# Patient Record
Sex: FEMALE | Race: WHITE | Employment: UNEMPLOYED
[De-identification: names, ages, dates, MRNs, and addresses within clinical notes are randomized per-mention and may not be internally consistent; named-entity substitution may affect disease eponyms.]

---

## 2017-10-08 ENCOUNTER — HEALTH MAINTENANCE LETTER (OUTPATIENT)
Age: 32
End: 2017-10-08

## 2018-06-18 ENCOUNTER — OFFICE VISIT (OUTPATIENT)
Dept: DERMATOLOGY | Facility: CLINIC | Age: 33
End: 2018-06-18
Payer: COMMERCIAL

## 2018-06-18 DIAGNOSIS — L50.8 AUTOIMMUNE URTICARIA: Primary | ICD-10-CM

## 2018-06-18 RX ORDER — CLINDAMYCIN PHOSPHATE 10 UG/ML
LOTION TOPICAL
COMMUNITY
Start: 2018-05-13

## 2018-06-18 RX ORDER — TRETINOIN 0.5 MG/G
CREAM TOPICAL
COMMUNITY
Start: 2018-02-18

## 2018-06-18 RX ORDER — ACETIC ACID 20.65 MG/ML
SOLUTION AURICULAR (OTIC)
COMMUNITY
Start: 2018-04-28

## 2018-06-18 RX ORDER — TRETINOIN 0.25 MG/G
CREAM TOPICAL
COMMUNITY
Start: 2017-12-18

## 2018-06-18 RX ORDER — LIFITEGRAST 50 MG/ML
SOLUTION/ DROPS OPHTHALMIC
COMMUNITY
Start: 2018-02-20

## 2018-06-18 RX ORDER — LEVOTHYROXINE SODIUM 112 UG/1
TABLET ORAL
COMMUNITY
Start: 2018-05-13

## 2018-06-18 RX ORDER — SODIUM FLUORIDE 6 MG/ML
PASTE, DENTIFRICE DENTAL
COMMUNITY
Start: 2018-04-07

## 2018-06-18 RX ORDER — DIPHENHYDRAMINE HCL 25 MG
CAPSULE ORAL
COMMUNITY

## 2018-06-18 RX ORDER — PREDNISONE 10 MG/1
TABLET ORAL
COMMUNITY
Start: 2018-06-04

## 2018-06-18 RX ORDER — FEXOFENADINE HCL 180 MG/1
TABLET ORAL
COMMUNITY

## 2018-06-18 RX ORDER — CHOLECALCIFEROL (VITAMIN D3) 10(400)/ML
DROPS ORAL
COMMUNITY

## 2018-06-18 RX ORDER — IBUPROFEN 200 MG
TABLET ORAL
COMMUNITY

## 2018-06-18 RX ORDER — AZITHROMYCIN 250 MG/1
TABLET, FILM COATED ORAL
COMMUNITY
Start: 2018-03-02

## 2018-06-18 ASSESSMENT — PAIN SCALES - GENERAL: PAINLEVEL: NO PAIN (0)
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
## 2018-06-18 NOTE — MR AVS SNAPSHOT
After Visit Summary   6/18/2018    Amarilys Pandya    MRN: [id number]           Patient Information     Date Of Birth          1985        Visit Information        Provider Department      6/18/2018 4:00 PM Emery Quinn MD [facility]        Today's Diagnoses     Autoimmune urticaria    -  1      Care Instructions    Impression/Plan:     ==> DDx Urticaria vasculitis (autoimmune with Thyroiditis) vs Urticaria (eczematous?)     Blood tests:  06/18/2018: total IgE, anti-IgE antibodies, TATE, TARAS, CBC diff, ESR     Patient should come back in the next few days for biopsy of a fresh lesion on the upper arm  Allergy specialist is asking for pre-authorisation of Xolair     Follow-up as soon patient has more lesions         If you have issues or need to come in sooner call [phone] and ask for Beulah Aguillon LPN            Follow-ups after your visit        Your next 10 appointments already scheduled     Jun 22, 2018  9:30 AM CDT   (Arrive by 9:15 AM)   Return Visit with Emery Quinn MD   [facility] ([facility])    [address] [place] [id number] [phone]              Future tests that were ordered for you today     Open Future Orders        Priority Expected Expires Ordered    Anti IgE Antibody Routine  6/19/2019 6/18/2018    IgE Routine  6/18/2019 6/18/2018    CBC with platelets differential Routine  6/18/2019 6/18/2018    Anti Nuclear [place] IgG by IFA with Reflex Routine  6/19/2019 6/18/2018    Erythrocyte sedimentation rate auto Routine  6/18/2019 6/18/2018            Who to contact     Please call your clinic at [phone] to:    Ask questions about your health    Make or cancel appointments    Discuss your medicines    Learn about your test results    Speak to your doctor            Additional Information About Your Visit        [employer] Information     [email or web address] gives you secure access to your electronic  health record. If you see a primary care provider, you can also send messages to your care team and make appointments. If you have questions, please call your primary care clinic.  If you do not have a primary care provider, please call [phone] and they will assist you.      [employer] is an electronic gateway that provides easy, online access to your medical records. With [employer], you can request a clinic appointment, read your test results, renew a prescription or communicate with your care team.     To access your existing account, please contact your [facility] or call [phone] for assistance.        Care EveryWhere ID     This is your Care EveryWhere ID. This could be used by other organizations to access your [facility] medical records  [id number]         Blood Pressure from Last 3 Encounters:   08/12/16 113/68   12/16/14 97/63   10/10/14 108/64    Weight from Last 3 Encounters:   08/12/16 89.4 kg (197 lb)   12/16/14 78 kg (172 lb)   10/10/14 73 kg (161 lb)               Primary Care Provider Office Phone # Fax #    Shanell Mawuena Agbeh, [phone]       [address] [place] [address]        Equal Access to Services     YASMINE JACKSON : Hadii aad ku hadasho Soomaali, waaxda luqadaha, qaybta kaalmada adeegyada, waxay idiin hayaan adeeg kharaangelo la'jared davis. So [facility] [phone].    ATENCIÓN: Si habla español, tiene a menard disposición servicios gratuitos de asistencia lingüística. Llame al [phone].    We comply with applicable federal civil rights laws and [place] laws. We do not discriminate on the basis of race, color, national origin, age, disability, sex, sexual orientation, or gender identity.            Thank you!     Thank you for choosing [facility]  for your care. Our goal is always to provide you with excellent care. Hearing back from our patients is one way we can continue to improve our services. Please take a few minutes to  complete the written survey that you may receive in the mail after your visit with us. Thank you!             Your Updated Medication List - Protect others around you: Learn how to safely use, store and throw away your medicines at www.disposemymeds.org.          This list is accurate as of 6/18/18  4:30 PM.  Always use your most recent med list.                   Brand Name Dispense Instructions for use Diagnosis    acetic acid 2 % otic solution    VOSOL          ADVIL 200 MG tablet   Generic drug:  ibuprofen           ALLEGRA ALLERGY 180 MG tablet   Generic drug:  fexofenadine           azithromycin 250 MG tablet    ZITHROMAX          BENADRYL 25 MG capsule   Generic drug:  diphenhydrAMINE           clindamycin 1 % lotion    CLEOCIN T          Digestive Enzyme Caps           levothyroxine 112 MCG tablet    SYNTHROID/LEVOTHROID          melatonin 1 MG Tabs tablet           MULTIPLE VITAMINS-IRON PO           predniSONE 10 MG tablet    DELTASONE          PRENATAL MULTIVITAMIN/IRON PO      Take  by mouth.        PREVIDENT 5000 BOOSTER PLUS 1.1 % Pste   Generic drug:  Sodium Fluoride           PROBIOTIC ADVANCED PO           SUPER OMEGA 3 EPA/DHA PO           * tretinoin 0.025 % cream    RETIN-A          * tretinoin 0.05 % cream    RETIN-A          Vitamin D-3 5000 units Tabs           XIIDRA 5 % Soln opthalmic solution   Generic drug:  Lifitegrast           * Notice:  This list has 2 medication(s) that are the same as other medications prescribed for you. Read the directions carefully, and ask your doctor or other care provider to review them with you.

## 2018-06-18 NOTE — NURSING NOTE
Dermatology Rooming Note    Amarilys Pandya's goals for this visit include:   Chief Complaint   Patient presents with     Derm Problem     Amarilys is here to talk about hives. She sent in pictures. She staets that they happen off and on for about 6 weeks.      Beulah Aguillon LPN
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
## 2018-06-18 NOTE — PATIENT INSTRUCTIONS
Impression/Plan:     ==> DDx Urticaria vasculitis (autoimmune with Thyroiditis) vs Urticaria (eczematous?)     Blood tests:  06/18/2018: total IgE, anti-IgE antibodies, TATE, TARAS, CBC diff, ESR     Patient should come back in the next few days for biopsy of a fresh lesion on the upper arm  Allergy specialist is asking for pre-authorisation of Xolair     Follow-up as soon patient has more lesions         If you have issues or need to come in sooner call [phone] and ask for Beulah Aguillon LPN

## 2018-06-22 ENCOUNTER — OFFICE VISIT (OUTPATIENT)
Dept: DERMATOLOGY | Facility: CLINIC | Age: 33
End: 2018-06-22
Payer: COMMERCIAL

## 2018-06-22 DIAGNOSIS — L50.8 AUTOIMMUNE URTICARIA: Primary | ICD-10-CM

## 2018-06-22 DIAGNOSIS — L50.8 AUTOIMMUNE URTICARIA: ICD-10-CM

## 2018-06-22 LAB
BASOPHILS # BLD AUTO: 0 10E9/L (ref 0–0.2)
BASOPHILS NFR BLD AUTO: 0.4 %
DIFFERENTIAL METHOD BLD: NORMAL
EOSINOPHIL # BLD AUTO: 0.2 10E9/L (ref 0–0.7)
EOSINOPHIL NFR BLD AUTO: 4.3 %
ERYTHROCYTE [DISTWIDTH] IN BLOOD BY AUTOMATED COUNT: 13.7 % (ref 10–15)
ERYTHROCYTE [SEDIMENTATION RATE] IN BLOOD BY WESTERGREN METHOD: 11 MM/H (ref 0–20)
HCT VFR BLD AUTO: 40.2 % (ref 35–47)
HGB BLD-MCNC: 13.3 G/DL (ref 11.7–15.7)
IMM GRANULOCYTES # BLD: 0 10E9/L (ref 0–0.4)
IMM GRANULOCYTES NFR BLD: 0.2 %
LYMPHOCYTES # BLD AUTO: 1.7 10E9/L (ref 0.8–5.3)
LYMPHOCYTES NFR BLD AUTO: 32.2 %
MCH RBC QN AUTO: 30.3 PG (ref 26.5–33)
MCHC RBC AUTO-ENTMCNC: 33.1 G/DL (ref 31.5–36.5)
MCV RBC AUTO: 92 FL (ref 78–100)
MONOCYTES # BLD AUTO: 0.4 10E9/L (ref 0–1.3)
MONOCYTES NFR BLD AUTO: 7.3 %
NEUTROPHILS # BLD AUTO: 3 10E9/L (ref 1.6–8.3)
NEUTROPHILS NFR BLD AUTO: 55.6 %
NRBC # BLD AUTO: 0 10*3/UL
NRBC BLD AUTO-RTO: 0 /100
PLATELET # BLD AUTO: 290 10E9/L (ref 150–450)
RBC # BLD AUTO: 4.39 10E12/L (ref 3.8–5.2)
WBC # BLD AUTO: 5.3 10E9/L (ref 4–11)

## 2018-06-22 ASSESSMENT — PAIN SCALES - GENERAL: PAINLEVEL: NO PAIN (0)
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
## 2018-06-22 NOTE — NURSING NOTE
Dermatology Rooming Note    Amarilys Pandya's goals for this visit include:   Chief Complaint   Patient presents with     Derm Problem     Amarilys is here today for a rash follow up- notes the rash is still there.      Coco Yanez MA

## 2018-06-22 NOTE — MR AVS SNAPSHOT
After Visit Summary   6/22/2018    Amarilys Pandya    MRN: [id number]           Patient Information     Date Of Birth          1985        Visit Information        Provider Department      6/22/2018 9:30 AM Emery Quinn MD [facility]        Today's Diagnoses     Autoimmune urticaria    -  1       Follow-ups after your visit        Your next 10 appointments already scheduled     Jun 22, 2018 10:45 AM CDT   LAB with [facility] ([facility])    [address] [place] [address] [phone]           Please do not eat 10-12 hours before your appointment if you are coming in fasting for labs on lipids, cholesterol, or glucose (sugar). This does not apply to pregnant women. Water, hot tea and black coffee (with nothing added) are okay. Do not drink other fluids, diet soda or chew gum.              Who to contact     Please call your clinic at [phone] to:    Ask questions about your health    Make or cancel appointments    Discuss your medicines    Learn about your test results    Speak to your doctor            Additional Information About Your Visit        [facility] [place] Information     [employer] gives you secure access to your electronic health record. If you see a primary care provider, you can also send messages to your care team and make appointments. If you have questions, please call your primary care clinic.  If you do not have a primary care provider, please call [phone] and they will assist you.      [employer] is an electronic gateway that provides easy, online access to your medical records. With [employer], you can request a clinic appointment, read your test results, renew a prescription or communicate with your care team.     To access your existing account, please contact your [facility] or call [phone] for assistance.        Care EveryWhere ID     This is your Care EveryWhere  ID. This could be used by other organizations to access your [place] medical records  [id number]         Blood Pressure from Last 3 Encounters:   08/12/16 113/68   12/16/14 97/63   10/10/14 108/64    Weight from Last 3 Encounters:   08/12/16 89.4 kg (197 lb)   12/16/14 78 kg (172 lb)   10/10/14 73 kg (161 lb)              We Performed the Following     OMALIZUMAB INJECTION        Primary Care Provider Office Phone # Fax #    Shanell Mawuena Agbeh, [phone] [address]  [place] [address]        Equal Access to Services     [facility] : Hadii aad ku hadasho Sojose martin, waaxda luqadaha, qaybta kaalmada adeegyada, emile olivia . So [facility] [phone].    ATENCIÓN: Si habla español, tiene a menard disposición servicios gratuitos de asistencia lingüística. Llame al [phone].    We comply with applicable federal civil rights laws and [place] laws. We do not discriminate on the basis of race, color, national origin, age, disability, sex, sexual orientation, or gender identity.            Thank you!     Thank you for choosing [facility] DERMATOLOG[facility]  for your care. Our goal is always to provide you with excellent care. Hearing back from our patients is one way we can continue to improve our services. Please take a few minutes to complete the written survey that you may receive in the mail after your visit with us. Thank you!             Your Updated Medication List - Protect others around you: Learn how to safely use, store and throw away your medicines at www.disposemymeds.org.          This list is accurate as of 6/22/18 10:37 AM.  Always use your most recent med list.                   Brand Name Dispense Instructions for use Diagnosis    acetic acid 2 % otic solution    VOSOL          ADVIL 200 MG tablet   Generic drug:  ibuprofen           ALLEGRA ALLERGY 180 MG tablet   Generic drug:  fexofenadine           azithromycin 250 MG tablet    ZITHROMAX           BENADRYL 25 MG capsule   Generic drug:  diphenhydrAMINE           clindamycin 1 % lotion    CLEOCIN T          Digestive Enzyme Caps           levothyroxine 112 MCG tablet    SYNTHROID/LEVOTHROID          melatonin 1 MG Tabs tablet           MULTIPLE VITAMINS-IRON PO           predniSONE 10 MG tablet    DELTASONE          PRENATAL MULTIVITAMIN/IRON PO      Take  by mouth.        PREVIDENT 5000 BOOSTER PLUS 1.1 % Pste   Generic drug:  Sodium Fluoride           PROBIOTIC ADVANCED PO           SUPER OMEGA 3 EPA/DHA PO           * tretinoin 0.025 % cream    RETIN-A          * tretinoin 0.05 % cream    RETIN-A          Vitamin D-3 5000 units Tabs           XIIDRA 5 % Soln opthalmic solution   Generic drug:  Lifitegrast           * Notice:  This list has 2 medication(s) that are the same as other medications prescribed for you. Read the directions carefully, and ask your doctor or other care provider to review them with you.
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
## 2018-06-24 LAB — IGE SERPL-ACNC: 43 KIU/L (ref 0–114)

## 2018-06-25 LAB
ANA PAT SER IF-IMP: ABNORMAL
ANA SER QL IF: POSITIVE
ANA TITR SER IF: ABNORMAL {TITER}

## 2018-06-28 LAB — ANTI IGE ANTIBODY: NORMAL

## 2018-07-03 PROBLEM — L50.8 AUTOIMMUNE URTICARIA: Status: ACTIVE | Noted: 2018-07-03

## 2018-07-16 ENCOUNTER — MYC MEDICAL ADVICE (OUTPATIENT)
Dept: DERMATOLOGY | Facility: CLINIC | Age: 33
End: 2018-07-16

## 2018-07-18 ENCOUNTER — OFFICE VISIT (OUTPATIENT)
Dept: DERMATOLOGY | Facility: CLINIC | Age: 33
End: 2018-07-18
Payer: COMMERCIAL

## 2018-07-18 DIAGNOSIS — L50.8 AUTOIMMUNE URTICARIA: Primary | ICD-10-CM

## 2018-07-18 ASSESSMENT — PAIN SCALES - GENERAL: PAINLEVEL: NO PAIN (0)

## 2018-07-18 NOTE — MR AVS SNAPSHOT
After Visit Summary   7/18/2018    Amarilys Pandya    MRN: [id number]           Patient Information     Date Of Birth          1985        Visit Information        Provider Department      7/18/2018 8:45 AM Ayah Yates PA-C [facility] Dermatology        Today's Diagnoses     Autoimmune urticaria    -  1       Follow-ups after your visit        Follow-up notes from your care team     Return in about 4 weeks (around 8/15/2018).      Your next 10 appointments already scheduled     Aug 28, 2018  2:45 PM CDT   (Arrive by 2:30 PM)   Return Visit with Emery Quinn MD   [facility] ([facility] [place])    [address] [address] [phone]              Who to contact     Please call your clinic at [phone] to:    Ask questions about your health    Make or cancel appointments    Discuss your medicines    Learn about your test results    Speak to your doctor            Additional Information About Your Visit        [employer] Information     [employer] gives you secure access to your electronic health record. If you see a primary care provider, you can also send messages to your care team and make appointments. If you have questions, please call your primary care clinic.  If you do not have a primary care provider, please call [phone] and they will assist you.      [employer] is an electronic gateway that provides easy, online access to your medical records. With [employer], you can request a clinic appointment, read your test results, renew a prescription or communicate with your care team.     To access your existing account, please contact your [facility] or call [phone] for assistance.        Care EveryWhere ID     This is your Care EveryWhere ID. This could be used by other organizations to access your [place] medical records  [id number]         Blood Pressure from Last 3 Encounters:    08/12/16 113/68   12/16/14 97/63   10/10/14 108/64    Weight from Last 3 Encounters:   08/12/16 89.4 kg (197 lb)   12/16/14 78 kg (172 lb)   10/10/14 73 kg (161 lb)              Today, you had the following     No orders found for display       Primary Care Provider Office Phone # Fax #    Shanell Mawuena Agbeh, [phone]       [address] [place] [address]        Equal Access to Services     [facility]: Hadii aad ku hadasho Soomaali, waaxda luqadaha, qaybta kaalmada adeegyada, emile olivia . So [facility] [phone].    ATENCIÓN: Si habla español, tiene a menard disposición servicios gratuitos de asistencia lingüística. [facility] [phone].    We comply with applicable federal civil rights laws and [place] laws. We do not discriminate on the basis of race, color, national origin, age, disability, sex, sexual orientation, or gender identity.            Thank you!     Thank you for choosing [facility] DERMATOLOGY  for your care. Our goal is always to provide you with excellent care. Hearing back from our patients is one way we can continue to improve our services. Please take a few minutes to complete the written survey that you may receive in the mail after your visit with us. Thank you!             Your Updated Medication List - Protect others around you: Learn how to safely use, store and throw away your medicines at www.disposemymeds.org.          This list is accurate as of 7/18/18  1:52 PM.  Always use your most recent med list.                   Brand Name Dispense Instructions for use Diagnosis    acetic acid 2 % otic solution    VOSOL          ADVIL 200 MG tablet   Generic drug:  ibuprofen           ALLEGRA ALLERGY 180 MG tablet   Generic drug:  fexofenadine           azithromycin 250 MG tablet    ZITHROMAX          BENADRYL 25 MG capsule   Generic drug:  diphenhydrAMINE           clindamycin 1 % lotion    CLEOCIN T          Digestive Enzyme Caps            levothyroxine 112 MCG tablet    SYNTHROID/LEVOTHROID          melatonin 1 MG Tabs tablet           MULTIPLE VITAMINS-IRON PO           predniSONE 10 MG tablet    DELTASONE          PRENATAL MULTIVITAMIN/IRON PO      Take  by mouth.        PREVIDENT 5000 BOOSTER PLUS 1.1 % Pste   Generic drug:  Sodium Fluoride           PROBIOTIC ADVANCED PO           SUPER OMEGA 3 EPA/DHA PO           * tretinoin 0.025 % cream    RETIN-A          * tretinoin 0.05 % cream    RETIN-A          Vitamin D-3 5000 units Tabs           XIIDRA 5 % Soln opthalmic solution   Generic drug:  Lifitegrast           * Notice:  This list has 2 medication(s) that are the same as other medications prescribed for you. Read the directions carefully, and ask your doctor or other care provider to review them with you.

## 2018-07-18 NOTE — NURSING NOTE
Dermatology Rooming Note    Amarilys Pandya's goals for this visit include:   Chief Complaint   Patient presents with     Derm Problem     Amarilys is here today for a rash follow up- notes that most of it has gone away.      Coco Yanez MA
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
## 2019-01-10 ENCOUNTER — OFFICE VISIT (OUTPATIENT)
Dept: RHEUMATOLOGY | Facility: CLINIC | Age: 34
End: 2019-01-10
Payer: COMMERCIAL

## 2019-01-10 VITALS
BODY MASS INDEX: 32.79 KG/M2 | WEIGHT: 178.2 LBS | HEIGHT: 62 IN | SYSTOLIC BLOOD PRESSURE: 105 MMHG | DIASTOLIC BLOOD PRESSURE: 71 MMHG | HEART RATE: 62 BPM | OXYGEN SATURATION: 99 %

## 2019-01-10 DIAGNOSIS — M35.01 SJOGREN'S SYNDROME WITH KERATOCONJUNCTIVITIS SICCA (H): ICD-10-CM

## 2019-01-10 DIAGNOSIS — R76.8 POSITIVE ANA (ANTINUCLEAR ANTIBODY): Primary | ICD-10-CM

## 2019-01-10 DIAGNOSIS — R21 RASH: ICD-10-CM

## 2019-01-10 LAB
ALBUMIN SERPL-MCNC: 3.9 G/DL (ref 3.4–5)
ALBUMIN UR-MCNC: NEGATIVE MG/DL
ALP SERPL-CCNC: 68 U/L (ref 40–150)
ALT SERPL W P-5'-P-CCNC: 20 U/L (ref 0–50)
ANION GAP SERPL CALCULATED.3IONS-SCNC: 4 MMOL/L (ref 3–14)
APPEARANCE UR: CLEAR
AST SERPL W P-5'-P-CCNC: 14 U/L (ref 0–45)
BASOPHILS # BLD AUTO: 0 10E9/L (ref 0–0.2)
BASOPHILS NFR BLD AUTO: 0.4 %
BILIRUB SERPL-MCNC: 0.4 MG/DL (ref 0.2–1.3)
BILIRUB UR QL STRIP: NEGATIVE
BUN SERPL-MCNC: 10 MG/DL (ref 7–30)
C3 SERPL-MCNC: 104 MG/DL (ref 76–169)
C4 SERPL-MCNC: 21 MG/DL (ref 15–50)
CALCIUM SERPL-MCNC: 8.8 MG/DL (ref 8.5–10.1)
CHLORIDE SERPL-SCNC: 106 MMOL/L (ref 94–109)
CK SERPL-CCNC: 75 U/L (ref 30–225)
CO2 SERPL-SCNC: 28 MMOL/L (ref 20–32)
COLOR UR AUTO: YELLOW
CREAT SERPL-MCNC: 0.6 MG/DL (ref 0.52–1.04)
CREAT UR-MCNC: 191 MG/DL
CRP SERPL-MCNC: <2.9 MG/L (ref 0–8)
DIFFERENTIAL METHOD BLD: NORMAL
EOSINOPHIL # BLD AUTO: 0.1 10E9/L (ref 0–0.7)
EOSINOPHIL NFR BLD AUTO: 2.8 %
ERYTHROCYTE [DISTWIDTH] IN BLOOD BY AUTOMATED COUNT: 13.5 % (ref 10–15)
ERYTHROCYTE [SEDIMENTATION RATE] IN BLOOD BY WESTERGREN METHOD: 9 MM/H (ref 0–20)
GFR SERPL CREATININE-BSD FRML MDRD: >90 ML/MIN/{1.73_M2}
GLUCOSE SERPL-MCNC: 94 MG/DL (ref 70–99)
GLUCOSE UR STRIP-MCNC: NEGATIVE MG/DL
HCT VFR BLD AUTO: 40.6 % (ref 35–47)
HGB BLD-MCNC: 13.8 G/DL (ref 11.7–15.7)
HGB UR QL STRIP: ABNORMAL
KETONES UR STRIP-MCNC: NEGATIVE MG/DL
LEUKOCYTE ESTERASE UR QL STRIP: NEGATIVE
LYMPHOCYTES # BLD AUTO: 1.5 10E9/L (ref 0.8–5.3)
LYMPHOCYTES NFR BLD AUTO: 31.9 %
MCH RBC QN AUTO: 29.9 PG (ref 26.5–33)
MCHC RBC AUTO-ENTMCNC: 34 G/DL (ref 31.5–36.5)
MCV RBC AUTO: 88 FL (ref 78–100)
MONOCYTES # BLD AUTO: 0.4 10E9/L (ref 0–1.3)
MONOCYTES NFR BLD AUTO: 7.9 %
NEUTROPHILS # BLD AUTO: 2.6 10E9/L (ref 1.6–8.3)
NEUTROPHILS NFR BLD AUTO: 57 %
NITRATE UR QL: NEGATIVE
NON-SQ EPI CELLS #/AREA URNS LPF: NORMAL /LPF
PH UR STRIP: 7.5 PH (ref 5–7)
PLATELET # BLD AUTO: 287 10E9/L (ref 150–450)
POTASSIUM SERPL-SCNC: 3.9 MMOL/L (ref 3.4–5.3)
PROT SERPL-MCNC: 7.5 G/DL (ref 6.8–8.8)
PROT UR-MCNC: 0.1 G/L
PROT/CREAT 24H UR: 0.05 G/G CR (ref 0–0.2)
RBC # BLD AUTO: 4.61 10E12/L (ref 3.8–5.2)
RBC #/AREA URNS AUTO: NORMAL /HPF
SODIUM SERPL-SCNC: 138 MMOL/L (ref 133–144)
SOURCE: ABNORMAL
SP GR UR STRIP: 1.01 (ref 1–1.03)
TSH SERPL DL<=0.005 MIU/L-ACNC: 0.42 MU/L (ref 0.4–4)
UROBILINOGEN UR STRIP-ACNC: 0.2 EU/DL (ref 0.2–1)
WBC # BLD AUTO: 4.6 10E9/L (ref 4–11)
WBC #/AREA URNS AUTO: NORMAL /HPF

## 2019-01-10 PROCEDURE — 00000401 ZZHCL STATISTIC THROMBIN TIME NC: Performed by: INTERNAL MEDICINE

## 2019-01-10 PROCEDURE — 84156 ASSAY OF PROTEIN URINE: CPT | Performed by: INTERNAL MEDICINE

## 2019-01-10 PROCEDURE — 81001 URINALYSIS AUTO W/SCOPE: CPT | Performed by: INTERNAL MEDICINE

## 2019-01-10 PROCEDURE — 36415 COLL VENOUS BLD VENIPUNCTURE: CPT | Performed by: INTERNAL MEDICINE

## 2019-01-10 PROCEDURE — 00000167 ZZHCL STATISTIC INR NC: Performed by: INTERNAL MEDICINE

## 2019-01-10 PROCEDURE — 86235 NUCLEAR ANTIGEN ANTIBODY: CPT | Performed by: INTERNAL MEDICINE

## 2019-01-10 PROCEDURE — 84443 ASSAY THYROID STIM HORMONE: CPT | Performed by: INTERNAL MEDICINE

## 2019-01-10 PROCEDURE — 82550 ASSAY OF CK (CPK): CPT | Performed by: INTERNAL MEDICINE

## 2019-01-10 PROCEDURE — 85613 RUSSELL VIPER VENOM DILUTED: CPT | Performed by: INTERNAL MEDICINE

## 2019-01-10 PROCEDURE — 80053 COMPREHEN METABOLIC PANEL: CPT | Performed by: INTERNAL MEDICINE

## 2019-01-10 PROCEDURE — 86160 COMPLEMENT ANTIGEN: CPT | Performed by: INTERNAL MEDICINE

## 2019-01-10 PROCEDURE — 86147 CARDIOLIPIN ANTIBODY EA IG: CPT | Performed by: INTERNAL MEDICINE

## 2019-01-10 PROCEDURE — 85730 THROMBOPLASTIN TIME PARTIAL: CPT | Performed by: INTERNAL MEDICINE

## 2019-01-10 PROCEDURE — 86140 C-REACTIVE PROTEIN: CPT | Performed by: INTERNAL MEDICINE

## 2019-01-10 PROCEDURE — 86146 BETA-2 GLYCOPROTEIN ANTIBODY: CPT | Performed by: INTERNAL MEDICINE

## 2019-01-10 PROCEDURE — 99204 OFFICE O/P NEW MOD 45 MIN: CPT | Performed by: INTERNAL MEDICINE

## 2019-01-10 PROCEDURE — 86225 DNA ANTIBODY NATIVE: CPT | Performed by: INTERNAL MEDICINE

## 2019-01-10 PROCEDURE — 85652 RBC SED RATE AUTOMATED: CPT | Performed by: INTERNAL MEDICINE

## 2019-01-10 PROCEDURE — 85025 COMPLETE CBC W/AUTO DIFF WBC: CPT | Performed by: INTERNAL MEDICINE

## 2019-01-10 ASSESSMENT — MIFFLIN-ST. JEOR: SCORE: 1476.72
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
## 2019-01-10 NOTE — NURSING NOTE
RAPID3 (0-30) Cumulative Score  0          RAPID3 Weighted Score (divide #4 by 3 and that is the weighted score)  0         Ruby Hung CMA Rheumatology  1/10/2019 7:26 AM

## 2019-01-11 LAB
B2 GLYCOPROT1 IGG SERPL IA-ACNC: 0.8 U/ML
B2 GLYCOPROT1 IGM SERPL IA-ACNC: 0.9 U/ML
CARDIOLIPIN ANTIBODY IGG: <1.6 GPL-U/ML (ref 0–19.9)
CARDIOLIPIN ANTIBODY IGM: 0.2 MPL-U/ML (ref 0–19.9)
DSDNA AB SER-ACNC: 1 IU/ML
ENA RNP IGG SER IA-ACNC: <0.2 AI (ref 0–0.9)
ENA SCL70 IGG SER IA-ACNC: <0.2 AI (ref 0–0.9)
ENA SM IGG SER-ACNC: <0.2 AI (ref 0–0.9)
ENA SS-A IGG SER IA-ACNC: <0.2 AI (ref 0–0.9)
ENA SS-B IGG SER IA-ACNC: <0.2 AI (ref 0–0.9)
LA PPP-IMP: NEGATIVE

## 2019-01-15 ENCOUNTER — OFFICE VISIT (OUTPATIENT)
Dept: DERMATOLOGY | Facility: CLINIC | Age: 34
End: 2019-01-15
Payer: COMMERCIAL

## 2019-01-15 DIAGNOSIS — L50.9 URTICARIA: Primary | ICD-10-CM

## 2019-01-15 PROCEDURE — 99213 OFFICE O/P EST LOW 20 MIN: CPT | Performed by: DERMATOLOGY

## 2019-01-15 ASSESSMENT — PAIN SCALES - GENERAL: PAINLEVEL: NO PAIN (0)
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
## 2019-01-15 NOTE — PATIENT INSTRUCTIONS
Take over the counter Zyrtec. Follow the week by week plan below. Only increase the dose each week if the hives persist.     Start with 1 pill a day for a week. If hives still flare, then take 1 pill in the morning, 1 at night for a week. If still having out breaks, take 1 pill in the morning, 2 at night. You can go up to 40 mg daily, 2 in the morning, 2 at night for a week if the hives still persist.

## 2019-01-15 NOTE — NURSING NOTE
Amarilys Pandya's goals for this visit include:   Chief Complaint   Patient presents with     Rash     Mostly lower arms -        She requests these members of her care team be copied on today's visit information: NO    PCP: Agbeh, Cephas Mawuena    Referring Provider:  No referring provider defined for this encounter.    There were no vitals taken for this visit.    Do you need any medication refills at today's visit? NO    Altagracia Sanders CMA

## 2019-05-08 ENCOUNTER — TELEPHONE (OUTPATIENT)
Dept: DERMATOLOGY | Facility: CLINIC | Age: 34
End: 2019-05-08

## 2019-05-08 NOTE — TELEPHONE ENCOUNTER
Patient needs to be scheduled for drug allergy testing.      Monday 15-30 minutes  Wednesday 15 minutes  Friday 30 Minutes     All return allergy

## 2020-02-24 ENCOUNTER — HEALTH MAINTENANCE LETTER (OUTPATIENT)
Age: 35
End: 2020-02-24

## 2020-07-27 ENCOUNTER — TELEPHONE (OUTPATIENT)
Dept: ALLERGY | Facility: CLINIC | Age: 35
End: 2020-07-27

## 2020-07-27 NOTE — TELEPHONE ENCOUNTER
Patient reports she is not receiving Xolair. Per Dr. Quinn, Xolair therapy plan should be discontinued.

## 2020-08-19 ENCOUNTER — THERAPY VISIT (OUTPATIENT)
Dept: PHYSICAL THERAPY | Facility: CLINIC | Age: 35
End: 2020-08-19
Payer: OTHER GOVERNMENT

## 2020-08-19 DIAGNOSIS — N81.89 PELVIC FLOOR WEAKNESS IN FEMALE: ICD-10-CM

## 2020-08-19 DIAGNOSIS — N39.8 VOIDING DYSFUNCTION: ICD-10-CM

## 2020-08-19 DIAGNOSIS — R32 URINARY INCONTINENCE: ICD-10-CM

## 2020-08-19 PROCEDURE — 97161 PT EVAL LOW COMPLEX 20 MIN: CPT | Mod: GP | Performed by: PHYSICAL THERAPIST

## 2020-08-19 PROCEDURE — 97535 SELF CARE MNGMENT TRAINING: CPT | Mod: GP | Performed by: PHYSICAL THERAPIST

## 2020-08-19 NOTE — LETTER
[facility]  [address] [address]  [address]  [place] [address]  [phone]    2020    Re: Amarilys Pandya   :   1985  MRN:  [id number]   REFERRING PHYSICIAN:   Richar [facility]  Date of Initial Evaluation: 2020  Visits:  Rxs Used: 1  Reason for Referral:     Urinary incontinence  Voiding dysfunction  Pelvic floor weakness in female    EVALUATION SUMMARY  [place] [facility] Initial Evaluation  Subjective:  The history is provided by the patient. No  was used.   Therapist Generated HPI Evaluation  Problem details: Onset of urinary incontinence after her 1st child and has had some on and off since then. What is newer, is that she will pee, stand up and then have to pee again. Seems to have worsened after her 3rd. Kids are 7,5,4. Most of the leakage occurs with increased pressure like coughing or sneezing.     Sometimes around the time of her period feels pressure like something is going to fall out but no tissue noted..         Type of problem:  Incontinence.    This is a new condition.  Condition occurred with:  After pregnancy.    Patient reports pain:  N/a.        Symptoms are exacerbated by sneezing and coughing        Patient Health History  Pain is reported as 0/10 on pain scale.  General health as reported by patient is good.  Pertinent medical history includes: overweight, migraines/headaches, thyroid problems and concussions/dizziness.   Red flags:  None as reported by patient.   Other medical allergies details: adhesive.           Re: Amarilys Pandya   :   1985    Current medications:  Thyroid medication.    Current occupation is Dental Hygiene.   Primary job tasks include:  Computer work and prolonged sitting.                                  SUBJECTIVE:        Verbal permission from patient to do internal pelvis muscle assessment?deferred due to period per patient request Verbal permission to complete external  perineal assessment? Deferred due to period per patient request Would you like someone else in the room as a chaperone? no      Urination:  Do you leak on the way to the bathroom or with a strong urge to void? No   Do you leak with cough,sneeze, jumping, running?Yes   Any other activities that cause leaking? Yes , sex when she was on her stomach, running  Do you have triggers that make you feel you can't wait to go to the bathroom? No what are they NA.  Type of pad and number used per day? None  When you leak what is the amount? Medium  How long can you delay the need to urinate? few hours.   How many times do you get up to urinate at night? 0-1   Can you stop the flow of urine when on the toilet? Hasn't tried. Isn't sure.  Is the volume of urine passed usually: medium. (8sec rule= 250ml with average bladder storing 400-600ml)  Do you feel empty when you are done? Yes, but then stands up and feels like she still has to pee. If she sits back down, may have to strain to pee again.   Do you strain to pass urine? Only if she has the urge, then she has to pee again and will strain then.   Do you have a slow or hesitant urinary stream? No  Do you have difficulty initiating the urine stream? No  Is urination painful? No  How many bladder infections have you had in last 12 months? 0  Fluid intake(one glass is 8oz or one cup) 3-4 glasses/day, 0-1 (pop/coffee) caffinated glasses/day  0 alcohol glasses/day.          Re: Amarilys Pandya   :   1985    Bowel habits:  Frequency of bowel movements? 7 times a week  Consistancy of stool? soft, [place] Stool Scale more like diarrhea. Changed diet to more fruits and veggies and notes that is when her stool changed.   Do you ignore the urge to defecate? No  Do you strain to pass stool? No    Aggrevating factors:  Is loss of stool associated with an activity (lifting, coughing, running) or a food)  No  Are there any foods that increase or decrease your symptoms?  No  Do you have  any food allergies?  Yes, honey, milk      Sensation:   Can you tell if there is solid, liquid, or gas in the rectum?  Yes  Do you feel the urge to move your bowels?  Yes  Is the urge very strong and difficult to control? NA Or weak/absent? NA  Do you feel the rectum is empty with you finish a bowel movement?   Yes    Do you have abdominal pain?  No  Describe the quality of the pain: NA  What makes your abdominal pain worse? NA  What makes your abdominal pain better? NA  Do you ever have pain that wakes you at night? No    Do you have rectal pain, pressure, or burning?  No     Pelvic Pain:  Do you have any pelvic pain with intercourse, exams, use of tampons? Yes, if her cervix is hit, it's a sharp pain.   Is initial penetration during intercourse painful? No  Is deeper penetration painful? Yes  Do you use lubricant? No What kind?   ?  Given birth? Yes Any complications? Last one got stuck  # of vaginal delieveries?3  # of C-sections?0  # of episiotomies?2  Are you sexually active?Yes  Have you ever been worried for your physical safety? No    Do you have any depression, anxiety, panic attacks, excessive stress?  Yes  Re: Amarilys HART Ya   :   1985    Any abdominal or pelvic surgeries? Circlage for 1st pregnancy  Are you having any regular exercise? No  Have you practiced the PF(kegel) exercises for 4 or more weeks?No  Thyroid checked? Yes, Hashimoto's disease-allergic to the sun and gets a rash from the sun. (related to hair loss, flu-like symptoms, wt gain/loss, fatigue, menopause)  Changed diet lately? Eating healthier    OBSERVATION  Lumbar Posture: Negative  Pelvic symmetry: Negative    ROM  Single leg standing unilateral hip flexion PSIS:Negative  Standing forward flexion PSIS:Negative  Passive Hip ROM:Negative  YUMIKO:Negative  YUMIKO with OP:Negative    MUSCLE PERFORMANCE  Pt notes that she just got her period today and preferred to defer the external and internal PF muscle assessment to next visit.  Will test PF muscle strength and coordination at follow up visit.            Pelvic Dysfunction Evaluation:      Flexibility:    Tightness not present at:  Adductors; Hamstrings or Piriformis        Assessment/Plan:    Patient is a 35 year old female with pelvic complaints.    Patient has the following significant findings with corresponding treatment plan.                Diagnosis 1:  Urinary incontinence with voiding dysfunction likely related to weakness in pelvic floor. Will be confirmed once able to do PF assessment.   Decreased ROM/flexibility - manual therapy, therapeutic exercise, therapeutic activity and home program  Decreased strength - therapeutic exercise, therapeutic activities and home program  Impaired muscle performance - neuro re-education and home program  Decreased function - therapeutic activities and home program          Re: Amarilys Pandya   :   1985      Therapy Evaluation Codes:   1) History comprised of:   Personal factors that impact the plan of care:      Time since onset of symptoms.    Comorbidity factors that impact the plan of care are:      Migraines/headaches, Overweight and Thyroid problems.     Medications impacting care: Thyroid, BCP.  2) Examination of Body Systems comprised of:   Body structures and functions that impact the plan of care:      Pelvis.   Activity limitations that impact the plan of care are:      Stress incontinence.  3) Clinical presentation characteristics are:   Stable/Uncomplicated.  4) Decision-Making    Low complexity using standardized patient assessment instrument and/or measureable assessment of functional outcome.  Cumulative Therapy Evaluation is: Low complexity.    Previous and current functional limitations:  (See Goal Flow Sheet for this information)    Short term and Long term goals: (See Goal Flow Sheet for this information)     Communication ability:  Patient appears to be able to clearly communicate and understand verbal and written  communication and follow directions correctly.  Treatment Explanation - The following has been discussed with the patient:   RX ordered/plan of care  Anticipated outcomes  Possible risks and side effects  This patient would benefit from PT intervention to resume normal activities.   Rehab potential is good.    Frequency:  1 X week, once daily  Duration:  for 6-8 weeks  Discharge Plan:  Achieve all LTG.  Independent in home treatment program.  Reach maximal therapeutic benefit.        Thank you for your referral.    INQUIRIES  Therapist: Becca Vinson, PT, MPT  [facility] [place] [facility]  [address] [address] [address]  Phone: [phone]  Fax: [phone]

## 2020-08-19 NOTE — PROGRESS NOTES
[place] [facility] Initial Evaluation  Subjective:  The history is provided by the patient. No  was used.   Therapist Generated HPI Evaluation  Problem details: Onset of urinary incontinence after her 1st child and has had some on and off since then. What is newer, is that she will pee, stand up and then have to pee again. Seems to have worsened after her 3rd. Kids are 7,5,4. Most of the leakage occurs with increased pressure like coughing or sneezing.     Sometimes around the time of her period feels pressure like something is going to fall out but no tissue noted..         Type of problem:  Incontinence.    This is a new condition.  Condition occurred with:  After pregnancy.    Patient reports pain:  N/a.        Symptoms are exacerbated by sneezing and coughing            Patient Health History         Pain is reported as 0/10 on pain scale.  General health as reported by patient is good.  Pertinent medical history includes: overweight, migraines/headaches, thyroid problems and concussions/dizziness.   Red flags:  None as reported by patient.   Other medical allergies details: adhesive.       Current medications:  Thyroid medication.    Current occupation is Dental Hygiene.   Primary job tasks include:  Computer work and prolonged sitting.                                  SUBJECTIVE:        Verbal permission from patient to do internal pelvis muscle assessment?deferred due to period per patient request Verbal permission to complete external perineal assessment? Deferred due to period per patient request Would you like someone else in the room as a chaperone? no      Urination:  Do you leak on the way to the bathroom or with a strong urge to void? No   Do you leak with cough,sneeze, jumping, running?Yes   Any other activities that cause leaking? Yes , sex when she was on her stomach, running  Do you have triggers that make you feel you can't wait to go to the bathroom? No what are  they NA.  Type of pad and number used per day? None  When you leak what is the amount? Medium  How long can you delay the need to urinate? few hours.   How many times do you get up to urinate at night? 0-1   Can you stop the flow of urine when on the toilet? Hasn't tried. Isn't sure.  Is the volume of urine passed usually: medium. (8sec rule= 250ml with average bladder storing 400-600ml)  Do you feel empty when you are done? Yes, but then stands up and feels like she still has to pee. If she sits back down, may have to strain to pee again.   Do you strain to pass urine? Only if she has the urge, then she has to pee again and will strain then.   Do you have a slow or hesitant urinary stream? No  Do you have difficulty initiating the urine stream? No  Is urination painful? No  How many bladder infections have you had in last 12 months? 0  Fluid intake(one glass is 8oz or one cup) 3-4 glasses/day, 0-1 (pop/coffee) caffinated glasses/day  0 alcohol glasses/day.    Bowel habits:  Frequency of bowel movements? 7 times a week  Consistancy of stool? soft, [place] Stool Scale more like diarrhea. Changed diet to more fruits and veggies and notes that is when her stool changed.   Do you ignore the urge to defecate? No  Do you strain to pass stool? No    Aggrevating factors:  Is loss of stool associated with an activity (lifting, coughing, running) or a food)  No  Are there any foods that increase or decrease your symptoms?  No  Do you have any food allergies?  Yes, honey, milk      Sensation:   Can you tell if there is solid, liquid, or gas in the rectum?  Yes  Do you feel the urge to move your bowels?  Yes  Is the urge very strong and difficult to control? NA Or weak/absent? NA  Do you feel the rectum is empty with you finish a bowel movement?   Yes    Do you have abdominal pain?  No  Describe the quality of the pain: NA  What makes your abdominal pain worse? NA  What makes your abdominal pain better? NA  Do you ever have  pain that wakes you at night? No    Do you have rectal pain, pressure, or burning?  No     Pelvic Pain:  Do you have any pelvic pain with intercourse, exams, use of tampons? Yes, if her cervix is hit, it's a sharp pain.   Is initial penetration during intercourse painful? No  Is deeper penetration painful? Yes  Do you use lubricant? No What kind?   ?  Given birth? Yes Any complications? Last one got stuck  # of vaginal delieveries?3  # of C-sections?0  # of episiotomies?2  Are you sexually active?Yes  Have you ever been worried for your physical safety? No    Do you have any depression, anxiety, panic attacks, excessive stress?  Yes  Any abdominal or pelvic surgeries? Circlage for 1st pregnancy  Are you having any regular exercise? No  Have you practiced the PF(kegel) exercises for 4 or more weeks?No  Thyroid checked? Yes, Hashimoto's disease-allergic to the sun and gets a rash from the sun. (related to hair loss, flu-like symptoms, wt gain/loss, fatigue, menopause)  Changed diet lately? Eating healthier    OBSERVATION  Lumbar Posture: Negative  Pelvic symmetry: Negative    ROM  Single leg standing unilateral hip flexion PSIS:Negative  Standing forward flexion PSIS:Negative  Passive Hip ROM:Negative  YUMIKO:Negative  YUMIKO with OP:Negative    MUSCLE PERFORMANCE  Pt notes that she just got her period today and preferred to defer the external and internal PF muscle assessment to next visit. Will test PF muscle strength and coordination at follow up visit.     Objective:  System                                 Pelvic Dysfunction Evaluation:        Flexibility:      Tightness not present at:  Adductors; Hamstrings or Piriformis                                     General     ROS    Assessment/Plan:    Patient is a 35 year old female with pelvic complaints.    Patient has the following significant findings with corresponding treatment plan.                Diagnosis 1:  Urinary incontinence with voiding dysfunction likely  related to weakness in pelvic floor. Will be confirmed once able to do PF assessment.   Decreased ROM/flexibility - manual therapy, therapeutic exercise, therapeutic activity and home program  Decreased strength - therapeutic exercise, therapeutic activities and home program  Impaired muscle performance - neuro re-education and home program  Decreased function - therapeutic activities and home program    Therapy Evaluation Codes:   1) History comprised of:   Personal factors that impact the plan of care:      Time since onset of symptoms.    Comorbidity factors that impact the plan of care are:      Migraines/headaches, Overweight and Thyroid problems.     Medications impacting care: Thyroid, BCP.  2) Examination of Body Systems comprised of:   Body structures and functions that impact the plan of care:      Pelvis.   Activity limitations that impact the plan of care are:      Stress incontinence.  3) Clinical presentation characteristics are:   Stable/Uncomplicated.  4) Decision-Making    Low complexity using standardized patient assessment instrument and/or measureable assessment of functional outcome.  Cumulative Therapy Evaluation is: Low complexity.    Previous and current functional limitations:  (See Goal Flow Sheet for this information)    Short term and Long term goals: (See Goal Flow Sheet for this information)     Communication ability:  Patient appears to be able to clearly communicate and understand verbal and written communication and follow directions correctly.  Treatment Explanation - The following has been discussed with the patient:   RX ordered/plan of care  Anticipated outcomes  Possible risks and side effects  This patient would benefit from PT intervention to resume normal activities.   Rehab potential is good.    Frequency:  1 X week, once daily  Duration:  for 6-8 weeks  Discharge Plan:  Achieve all LTG.  Independent in home treatment program.  Reach maximal therapeutic benefit.    Please  refer to the daily flowsheet for treatment today, total treatment time and time spent performing 1:1 timed codes.

## 2020-09-03 ENCOUNTER — THERAPY VISIT (OUTPATIENT)
Dept: PHYSICAL THERAPY | Facility: CLINIC | Age: 35
End: 2020-09-03
Payer: OTHER GOVERNMENT

## 2020-09-03 DIAGNOSIS — N39.8 VOIDING DYSFUNCTION: ICD-10-CM

## 2020-09-03 DIAGNOSIS — N81.89 PELVIC FLOOR WEAKNESS IN FEMALE: ICD-10-CM

## 2020-09-03 DIAGNOSIS — R32 URINARY INCONTINENCE: ICD-10-CM

## 2020-09-03 PROCEDURE — 97112 NEUROMUSCULAR REEDUCATION: CPT | Mod: GP | Performed by: PHYSICAL THERAPIST

## 2020-09-10 ENCOUNTER — THERAPY VISIT (OUTPATIENT)
Dept: PHYSICAL THERAPY | Facility: CLINIC | Age: 35
End: 2020-09-10
Payer: OTHER GOVERNMENT

## 2020-09-10 DIAGNOSIS — N39.8 VOIDING DYSFUNCTION: ICD-10-CM

## 2020-09-10 DIAGNOSIS — R32 URINARY INCONTINENCE: ICD-10-CM

## 2020-09-10 DIAGNOSIS — N81.89 PELVIC FLOOR WEAKNESS IN FEMALE: ICD-10-CM

## 2020-09-10 PROCEDURE — 97112 NEUROMUSCULAR REEDUCATION: CPT | Mod: GP | Performed by: PHYSICAL THERAPIST

## 2020-09-10 PROCEDURE — 97110 THERAPEUTIC EXERCISES: CPT | Mod: GP | Performed by: PHYSICAL THERAPIST

## 2020-09-24 ENCOUNTER — THERAPY VISIT (OUTPATIENT)
Dept: PHYSICAL THERAPY | Facility: CLINIC | Age: 35
End: 2020-09-24
Payer: OTHER GOVERNMENT

## 2020-09-24 DIAGNOSIS — R32 URINARY INCONTINENCE: ICD-10-CM

## 2020-09-24 DIAGNOSIS — N81.89 PELVIC FLOOR WEAKNESS IN FEMALE: ICD-10-CM

## 2020-09-24 DIAGNOSIS — N39.8 VOIDING DYSFUNCTION: ICD-10-CM

## 2020-09-24 DIAGNOSIS — M25.571 PAIN IN JOINT, ANKLE AND FOOT, RIGHT: ICD-10-CM

## 2020-09-24 PROCEDURE — 97140 MANUAL THERAPY 1/> REGIONS: CPT | Mod: GP | Performed by: PHYSICAL THERAPIST

## 2020-09-24 PROCEDURE — 97161 PT EVAL LOW COMPLEX 20 MIN: CPT | Mod: GP | Performed by: PHYSICAL THERAPIST

## 2020-09-24 PROCEDURE — 97110 THERAPEUTIC EXERCISES: CPT | Mod: GP | Performed by: PHYSICAL THERAPIST

## 2020-09-24 NOTE — PROGRESS NOTES
"[place] [facility] Initial Evaluation  Subjective:  The history is provided by the patient. No  was used.   Patient Health History  Amarilys Pandya being seen for R foot pain.     Date of Onset: about July 2020.   Problem occurred: slipped in shower and walking barefoot   Pain is reported as 8/10 (can increase to 10/10) on pain scale.  General health as reported by patient is good.  Pertinent medical history includes: thyroid problems and migraines/headaches.   Red flags:  None as reported by patient.  Medical allergies: other (see epic).   Surgeries include:  None.    Current medications:  Thyroid medication.    Current occupation is dental hygenist.   Primary job tasks: currently not working due to covid.                  Therapist Generated HPI Evaluation  Problem details: Date of MD order for this episode was 8-11-20. Amarilys states she slipped in the shower about July and injured the R foot, she saw her family dr and then was referred to a foot specialist. She was put in a cam boot and wore it consistently until the past couple weeks when she started having hip and arch pain. It was getting so bad it was difficult to walk. She uses the boot on occasion if really sore. Has been wearing a sandal with arch support.   Amarilys states she did have some minor pain in the R foot prior to the slip in the shower when she was home due to covid and walking on her tile floors all day without shoes. .         Type of problem:  Right ankle and right foot.    This is a new condition.  Condition occurred with:  A fall/slip (going without shoes on hard rebecca).  Where condition occurred: at home.  Patient reports pain:  Other (arch of R foot and lateral lower leg).  Pain is described as aching (\"giant bruise\") and is constant (arch pain).  Pain is the same all the time.  Since onset symptoms are gradually worsening.  Associated symptoms:  Other, loss of motion/stiffness and loss of strength (feels " feet are pronating). Symptoms are exacerbated by activity (all)  and relieved by rest.  Special tests included:  MRI (torn tendon-peroneal).    Work activity restrictions: currently not working.  Barriers include:  Stairs (split level).                        Objective:    Gait:    Gait Type:  Antalgic   Assistive Devices:  None      Flexibility/Screens:       Lower Extremity:      Decreased right lower extremity flexibility:  Gastroc and Soleus          Ankle/Foot Evaluation  ROM:    AROM:    Dorsiflexion: Left:    Right:   0  Plantarflexion: Left:     Right:  60  Inversion: Left:      Right:  Wnl  Eversion:     Right:  Wnl with pain      PROM:    Dorsiflexion: Left:        Right:   3   Plantarflexion: Left:        Right:  62      Great Toe Flexion: Left:      Right:  30    Great Toe Extension: Left:        Right: 80      Strength:    Dorsiflexion:  Right: 5/5   Pain:  Plantarflexion: Right: /5 strong/painful Pain:  Inversion:Right: 4+/5  Pain:  Eversion:Right: 4-/5   Pain:-/+                  LIGAMENT TESTING: not assessed                PALPATION: Palpation of ankle: Amarilys reports peroneal pain is much less than it has been.    Right ankle tenderness present at:   gastroc/soleus; anterior tibialis and plantar fascia  Right ankle tenderness not present at:  achilles tendon  EDEMA: Edema ankle: pocket of swelling just ant to medial calcaneal area.          MOBILITY TESTING:       Talocrural Right: hypomobile      First Ray Left: normal      FUNCTIONAL TESTS: Functional test ankle: unable to test due to pain.                                                              General     ROS    Assessment/Plan:    Patient is a 35 year old female with right side ankle/foot complaints.    Patient has the following significant findings with corresponding treatment plan.                Diagnosis 1:  Ankle/foot pain R  Pain -  hot/cold therapy, manual therapy, self management, education and home program  Decreased  ROM/flexibility - manual therapy, therapeutic exercise and home program  Decreased joint mobility - manual therapy, therapeutic exercise and home program  Decreased strength - therapeutic exercise, therapeutic activities and home program  Impaired balance - neuro re-education, therapeutic activities and home program  Decreased proprioception - neuro re-education, therapeutic activities and home program  Inflammation - cold therapy, US and self management/home program  Impaired gait - gait training and home program  Impaired muscle performance - neuro re-education and home program  Decreased function - therapeutic activities and home program    Therapy Evaluation Codes:   1) History comprised of:   Personal factors that impact the plan of care:      Time since onset of symptoms.    Comorbidity factors that impact the plan of care are:      None.     Medications impacting care: None.  2) Examination of Body Systems comprised of:   Body structures and functions that impact the plan of care:      Ankle.   Activity limitations that impact the plan of care are:      Cooking, Driving, Dressing, Jumping, Lifting, Running, Squatting/kneeling, Stairs, Standing, Walking, Working and Sleeping.  3) Clinical presentation characteristics are:   Stable/Uncomplicated.  4) Decision-Making    Low complexity using standardized patient assessment instrument and/or measureable assessment of functional outcome.  Cumulative Therapy Evaluation is: Low complexity.    Previous and current functional limitations:  (See Goal Flow Sheet for this information)    Short term and Long term goals: (See Goal Flow Sheet for this information)     Communication ability:  Patient appears to be able to clearly communicate and understand verbal and written communication and follow directions correctly.  Treatment Explanation - The following has been discussed with the patient:   RX ordered/plan of care  Anticipated outcomes  Possible risks and side  effects  This patient would benefit from PT intervention to resume normal activities.   Rehab potential is good.    Frequency:  1 X week, once daily  Duration:  for 6-8 weeks  Discharge Plan:  Achieve all LTG.  Independent in home treatment program.  Reach maximal therapeutic benefit.    Please refer to the daily flowsheet for treatment today, total treatment time and time spent performing 1:1 timed codes.

## 2020-09-28 ENCOUNTER — THERAPY VISIT (OUTPATIENT)
Dept: PHYSICAL THERAPY | Facility: CLINIC | Age: 35
End: 2020-09-28
Payer: OTHER GOVERNMENT

## 2020-09-28 DIAGNOSIS — M25.571 PAIN IN JOINT, ANKLE AND FOOT, RIGHT: ICD-10-CM

## 2020-09-28 PROCEDURE — 97110 THERAPEUTIC EXERCISES: CPT | Mod: GP | Performed by: PHYSICAL THERAPIST

## 2020-09-28 PROCEDURE — 97140 MANUAL THERAPY 1/> REGIONS: CPT | Mod: GP | Performed by: PHYSICAL THERAPIST

## 2020-09-28 NOTE — LETTER
[facility]  [id number] [facility]  [address]  [place] [address]  [phone]    2020    Re: Amarilys Pandya   :   1985  MRN:  [id number]   REFERRING PHYSICIAN:   [facility]    Date of Initial Evaluation: 20  Visits:     Reason for Referral:  Pain in joint, ankle and foot, right    EVALUATION SUMMARY    Discharge Note    Progress reporting period is from initial evaluation date (please see noted date below) to Sep 28, 2020.  Linked Episodes   Type: Episode: Status: Noted: Resolved: Last update: Updated by:   PHYSICAL THERAPY ankle/foot 20 Active 2020  9:56 AM Stephanie Thomas, PT      Comments:       Amarilys missed a follow up appt due to schedule confusion. Please see information below for last relevant information on current status.  Patient seen for  2  visits.    SUBJECTIVE  Subjective changes noted by patient:  The following is from last visit seen-Feeling much improved, was able to get a new shoe and is wearing in the house(b[place] with back)  .  Current pain level is 5/10.     Previous pain level was  8/10(increases to 10/10).   Changes in function:  Yes (See Goal flowsheet attached for changes in current functional level)  Adverse reaction to treatment or activity: None    OBJECTIVE  Changes noted in objective findings: reassessment not done as POC not completed. Was walking much improved at second visit.      ASSESSMENT/PLAN  Diagnosis: R ankle/foot pain  Updated problem list and treatment plan:   continuew with HEP    Amarilys Pandya   :   1985          STG/LTGs have been met or progress has been made towards goals:  Yes, please see goal flowsheet for most current information  Assessment of Progress: current status is unknown.    Last current status:     Self Management Plans:  HEP  I have re-evaluated this patient and find that the nature, scope, duration and intensity of the therapy is appropriate for the  medical condition of the patient.  Amarilys continues to require the following intervention to meet STG and LTG's:  HEP.    Recommendations:  Discharge with current home program.  Patient to follow up with MD as needed.            Thank you for your referral.    INQUIRIES  Therapist: Stephanie Thomas, PT  [facility]  [address] [facility] [address]  [place] [address]  Phone: [phone]  Fax: [phone]

## 2020-10-19 PROBLEM — M25.571 PAIN IN JOINT, ANKLE AND FOOT, RIGHT: Status: RESOLVED | Noted: 2020-09-24 | Resolved: 2020-10-19

## 2020-10-19 NOTE — PROGRESS NOTES
Discharge Note    Progress reporting period is from initial evaluation date (please see noted date below) to Sep 28, 2020.  Linked Episodes   Type: Episode: Status: Noted: Resolved: Last update: Updated by:   PHYSICAL THERAPY ankle/foot 9-24-20 Active 9/24/2020 9/28/2020  9:56 AM Stephanie Thomas PT      Comments:       Amarilys missed a follow up appt due to schedule confusion. Please see information below for last relevant information on current status.  Patient seen for  2  visits.    SUBJECTIVE  Subjective changes noted by patient:  The following is from last visit seen-Feeling much improved, was able to get a new shoe and is wearing in the house([place] with back)  .  Current pain level is 5/10.     Previous pain level was  8/10(increases to 10/10).   Changes in function:  Yes (See Goal flowsheet attached for changes in current functional level)  Adverse reaction to treatment or activity: None    OBJECTIVE  Changes noted in objective findings: reassessment not done as POC not completed. Was walking much improved at second visit.      ASSESSMENT/PLAN  Diagnosis: R ankle/foot pain  Updated problem list and treatment plan:   continuew with HEP  STG/LTGs have been met or progress has been made towards goals:  Yes, please see goal flowsheet for most current information  Assessment of Progress: current status is unknown.    Last current status:     Self Management Plans:  HEP  I have re-evaluated this patient and find that the nature, scope, duration and intensity of the therapy is appropriate for the medical condition of the patient.  Amarilys continues to require the following intervention to meet STG and LTG's:  HEP.    Recommendations:  Discharge with current home program.  Patient to follow up with MD as needed.    Please refer to the daily flowsheet for treatment today, total treatment time and time spent performing 1:1 timed codes.

## 2020-12-13 ENCOUNTER — HEALTH MAINTENANCE LETTER (OUTPATIENT)
Age: 35
End: 2020-12-13

## 2021-04-17 ENCOUNTER — HEALTH MAINTENANCE LETTER (OUTPATIENT)
Age: 36
End: 2021-04-17

## 2021-07-06 PROBLEM — N39.8 VOIDING DYSFUNCTION: Status: RESOLVED | Noted: 2020-08-19 | Resolved: 2021-07-06

## 2021-07-06 PROBLEM — R32 URINARY INCONTINENCE: Status: RESOLVED | Noted: 2020-08-19 | Resolved: 2021-07-06

## 2021-07-06 PROBLEM — N81.89 PELVIC FLOOR WEAKNESS IN FEMALE: Status: RESOLVED | Noted: 2020-08-19 | Resolved: 2021-07-06

## 2021-09-26 ENCOUNTER — HEALTH MAINTENANCE LETTER (OUTPATIENT)
Age: 36
End: 2021-09-26

## 2022-05-08 ENCOUNTER — HEALTH MAINTENANCE LETTER (OUTPATIENT)
Age: 37
End: 2022-05-08

## 2023-01-08 ENCOUNTER — HEALTH MAINTENANCE LETTER (OUTPATIENT)
Age: 38
End: 2023-01-08

## 2023-06-02 ENCOUNTER — HEALTH MAINTENANCE LETTER (OUTPATIENT)
Age: 38
End: 2023-06-02

## 2024-06-05 ENCOUNTER — APPOINTMENT (RX ONLY)
Dept: URBAN - METROPOLITAN AREA CLINIC 142 | Facility: CLINIC | Age: 39
Setting detail: DERMATOLOGY
End: 2024-06-05

## 2024-06-05 DIAGNOSIS — L82.0 INFLAMED SEBORRHEIC KERATOSIS: ICD-10-CM

## 2024-06-05 DIAGNOSIS — L81.5 LEUKODERMA, NOT ELSEWHERE CLASSIFIED: ICD-10-CM

## 2024-06-05 DIAGNOSIS — L81.4 OTHER MELANIN HYPERPIGMENTATION: ICD-10-CM

## 2024-06-05 PROCEDURE — ? COUNSELING

## 2024-06-05 PROCEDURE — 17110 DESTRUCTION B9 LES UP TO 14: CPT

## 2024-06-05 PROCEDURE — 99202 OFFICE O/P NEW SF 15 MIN: CPT | Mod: 25

## 2024-06-05 PROCEDURE — ? LIQUID NITROGEN

## 2024-06-05 PROCEDURE — ? ADDITIONAL NOTES

## 2024-06-05 ASSESSMENT — LOCATION DETAILED DESCRIPTION DERM
LOCATION DETAILED: RIGHT PROXIMAL PRETIBIAL REGION
LOCATION DETAILED: RIGHT INFERIOR LATERAL MALAR CHEEK
LOCATION DETAILED: LEFT PROXIMAL PRETIBIAL REGION
LOCATION DETAILED: INFERIOR MID FOREHEAD
LOCATION DETAILED: LEFT INFERIOR LATERAL MALAR CHEEK
LOCATION DETAILED: LEFT KNEE
LOCATION DETAILED: LEFT LATERAL SUBMANDIBULAR CHEEK
LOCATION DETAILED: RIGHT KNEE

## 2024-06-05 ASSESSMENT — LOCATION SIMPLE DESCRIPTION DERM
LOCATION SIMPLE: RIGHT CHEEK
LOCATION SIMPLE: LEFT PRETIBIAL REGION
LOCATION SIMPLE: RIGHT KNEE
LOCATION SIMPLE: LEFT CHEEK
LOCATION SIMPLE: RIGHT PRETIBIAL REGION
LOCATION SIMPLE: LEFT KNEE
LOCATION SIMPLE: INFERIOR FOREHEAD

## 2024-06-05 ASSESSMENT — LOCATION ZONE DERM
LOCATION ZONE: FACE
LOCATION ZONE: LEG

## 2024-06-05 NOTE — PROCEDURE: ADDITIONAL NOTES
Additional Notes: 6/5/24 recommended Hydroquinone once pt is no longer pregnant, OTC the Ordinary Azelaic acid safe during pregnancy/breast feeding. Pt aware.
Detail Level: Simple
Render Risk Assessment In Note?: yes

## 2024-06-05 NOTE — PROCEDURE: LIQUID NITROGEN
Application Tool (Optional): Liquid Nitrogen Sprayer
Show Topical Anesthesia Variable?: Yes
Consent: The patient's consent was obtained including but not limited to risks of crusting, scabbing, blistering, scarring, darker or lighter pigmentary change, recurrence, incomplete removal and infection.
Medical Necessity Information: It is in your best interest to select a reason for this procedure from the list below. All of these items fulfill various CMS LCD requirements except the new and changing color options.
Number Of Freeze-Thaw Cycles: 2 freeze-thaw cycles
Spray Paint Technique: No
Medical Necessity Clause: This procedure was medically necessary because the lesions that were treated were:
Detail Level: Detailed
Post-Care Instructions: I reviewed with the patient in detail post-care instructions. Patient is to wear sunprotection, and avoid picking at any of the treated lesions. Pt may apply Vaseline to crusted or scabbing areas.
Duration Of Freeze Thaw-Cycle (Seconds): 2
Spray Paint Text: The liquid nitrogen was applied to the skin utilizing a spray paint frosting technique.

## 2024-12-03 ENCOUNTER — APPOINTMENT (OUTPATIENT)
Dept: URBAN - METROPOLITAN AREA CLINIC 142 | Facility: CLINIC | Age: 39
Setting detail: DERMATOLOGY
End: 2024-12-03

## 2024-12-03 DIAGNOSIS — L65.9 NONSCARRING HAIR LOSS, UNSPECIFIED: ICD-10-CM | Status: INADEQUATELY CONTROLLED

## 2024-12-03 PROCEDURE — ? COUNSELING

## 2024-12-03 PROCEDURE — ? PRESCRIPTION

## 2024-12-03 PROCEDURE — ? PRESCRIPTION MEDICATION MANAGEMENT

## 2024-12-03 PROCEDURE — 99214 OFFICE O/P EST MOD 30 MIN: CPT

## 2024-12-03 PROCEDURE — ? ADDITIONAL NOTES

## 2024-12-03 RX ORDER — MINOXIDIL 50 MG/G
AEROSOL, FOAM TOPICAL
Qty: 60 | Refills: 5 | Status: ERX | COMMUNITY
Start: 2024-12-03

## 2024-12-03 RX ADMIN — MINOXIDIL: 50 AEROSOL, FOAM TOPICAL at 00:00

## 2024-12-03 ASSESSMENT — LOCATION SIMPLE DESCRIPTION DERM
LOCATION SIMPLE: RIGHT TEMPLE
LOCATION SIMPLE: SUPERIOR FOREHEAD

## 2024-12-03 ASSESSMENT — LOCATION DETAILED DESCRIPTION DERM
LOCATION DETAILED: RIGHT LATERAL TEMPLE
LOCATION DETAILED: SUPERIOR MID FOREHEAD

## 2024-12-03 ASSESSMENT — LOCATION ZONE DERM: LOCATION ZONE: FACE

## 2024-12-03 NOTE — PROCEDURE: ADDITIONAL NOTES
Render Risk Assessment In Note?: yes
Detail Level: Simple
Additional Notes: Oral Minoxidil once done breast feeding

## 2024-12-03 NOTE — PROCEDURE: PRESCRIPTION MEDICATION MANAGEMENT
Initiate Treatment: minoxidil 5 % topical foam: Apply to scalp nightly
Render In Strict Bullet Format?: No
Detail Level: Simple
Plan: Nutrafol or Biotin 2500mcg OTC once daily (stop 1 week prior to bloodwork for thyroid)\\nVitamin D 1000mcg daily\\nMultivitamin with zinc/selenium\\n\\nRecently had labwork with PCP. States vitamin D was low

## 2024-12-17 ENCOUNTER — APPOINTMENT (OUTPATIENT)
Dept: URBAN - METROPOLITAN AREA CLINIC 142 | Facility: CLINIC | Age: 39
Setting detail: DERMATOLOGY
End: 2024-12-17

## 2024-12-17 DIAGNOSIS — L65.9 NONSCARRING HAIR LOSS, UNSPECIFIED: ICD-10-CM

## 2024-12-17 DIAGNOSIS — D492 NEOPLASM OF UNSPECIFIED NATURE OF BONE, SOFT TISSUE, AND SKIN: ICD-10-CM | Status: STABLE

## 2024-12-17 PROBLEM — R22.42 LOCALIZED SWELLING, MASS AND LUMP, LEFT LOWER LIMB: Status: ACTIVE | Noted: 2024-12-17

## 2024-12-17 PROCEDURE — 99214 OFFICE O/P EST MOD 30 MIN: CPT

## 2024-12-17 PROCEDURE — ? COUNSELING

## 2024-12-17 PROCEDURE — ? PRESCRIPTION MEDICATION MANAGEMENT

## 2024-12-17 PROCEDURE — ? ADDITIONAL NOTES

## 2024-12-17 ASSESSMENT — LOCATION ZONE DERM
LOCATION ZONE: FACE
LOCATION ZONE: SCALP
LOCATION ZONE: LEG

## 2024-12-17 ASSESSMENT — LOCATION DETAILED DESCRIPTION DERM
LOCATION DETAILED: RIGHT LATERAL TEMPLE
LOCATION DETAILED: SUPERIOR MID FOREHEAD
LOCATION DETAILED: LEFT ANTERIOR PROXIMAL THIGH
LOCATION DETAILED: LEFT SUPERIOR PARIETAL SCALP

## 2024-12-17 ASSESSMENT — LOCATION SIMPLE DESCRIPTION DERM
LOCATION SIMPLE: SCALP
LOCATION SIMPLE: SUPERIOR FOREHEAD
LOCATION SIMPLE: LEFT THIGH
LOCATION SIMPLE: RIGHT TEMPLE

## 2024-12-17 NOTE — PROCEDURE: ADDITIONAL NOTES
Render Risk Assessment In Note?: yes
Detail Level: Simple
Additional Notes: Oral Minoxidil once done breast feeding
Additional Notes: No treatment necessary at this time. If spot changes/grows/becomes symptomatic, pt to return for OV and us rx

## 2024-12-17 NOTE — PROCEDURE: PRESCRIPTION MEDICATION MANAGEMENT
Initiate Treatment: Minoxidil foam otc as directed
Render In Strict Bullet Format?: No
Detail Level: Simple
Plan: Nutrafol or Biotin 2500mcg OTC once daily (stop 1 week prior to bloodwork for thyroid)\\nVitamin D 1000mcg daily\\nMultivitamin with zinc/selenium

## 2025-07-01 ENCOUNTER — APPOINTMENT (OUTPATIENT)
Dept: URBAN - METROPOLITAN AREA CLINIC 142 | Facility: CLINIC | Age: 40
Setting detail: DERMATOLOGY
End: 2025-07-01

## 2025-07-01 DIAGNOSIS — L65.9 NONSCARRING HAIR LOSS, UNSPECIFIED: ICD-10-CM | Status: INADEQUATELY CONTROLLED

## 2025-07-01 DIAGNOSIS — L81.1 CHLOASMA: ICD-10-CM | Status: INADEQUATELY CONTROLLED

## 2025-07-01 PROCEDURE — ?

## 2025-07-01 PROCEDURE — ? PRESCRIPTION

## 2025-07-01 PROCEDURE — ? PRESCRIPTION MEDICATION MANAGEMENT

## 2025-07-01 PROCEDURE — ? COUNSELING

## 2025-07-01 RX ORDER — HYDROQUINONE 40 MG/G
CREAM TOPICAL
Qty: 28.4 | Refills: 2 | Status: ERX | COMMUNITY
Start: 2025-07-01

## 2025-07-01 RX ADMIN — HYDROQUINONE: 40 CREAM TOPICAL at 00:00

## 2025-07-01 ASSESSMENT — LOCATION ZONE DERM
LOCATION ZONE: FACE
LOCATION ZONE: SCALP

## 2025-07-01 ASSESSMENT — LOCATION SIMPLE DESCRIPTION DERM
LOCATION SIMPLE: SCALP
LOCATION SIMPLE: SUPERIOR FOREHEAD
LOCATION SIMPLE: RIGHT TEMPLE

## 2025-07-01 ASSESSMENT — LOCATION DETAILED DESCRIPTION DERM
LOCATION DETAILED: SUPERIOR MID FOREHEAD
LOCATION DETAILED: RIGHT LATERAL TEMPLE
LOCATION DETAILED: LEFT SUPERIOR PARIETAL SCALP

## 2025-07-01 NOTE — PROCEDURE: PRESCRIPTION MEDICATION MANAGEMENT
Render In Strict Bullet Format?: No
Discontinue Regimen: Minoxidil foam otc patient states it causes heart palpitations
Detail Level: Simple
Plan: Vitamin D 4070-7478 mcg daily\\nPrenatal vs Nutrafol\\nPRP with [facility] [facility]\\nRed light therapy cap (currently using)